# Patient Record
Sex: MALE | Race: WHITE | NOT HISPANIC OR LATINO | URBAN - METROPOLITAN AREA
[De-identification: names, ages, dates, MRNs, and addresses within clinical notes are randomized per-mention and may not be internally consistent; named-entity substitution may affect disease eponyms.]

---

## 2018-08-28 ENCOUNTER — EMERGENCY (EMERGENCY)
Facility: HOSPITAL | Age: 18
LOS: 1 days | Discharge: HOME | End: 2018-08-28
Admitting: EMERGENCY MEDICINE
Payer: COMMERCIAL

## 2018-08-28 VITALS
SYSTOLIC BLOOD PRESSURE: 127 MMHG | OXYGEN SATURATION: 98 % | DIASTOLIC BLOOD PRESSURE: 70 MMHG | RESPIRATION RATE: 18 BRPM | HEART RATE: 61 BPM | TEMPERATURE: 98 F

## 2018-08-28 DIAGNOSIS — S61.412A LACERATION WITHOUT FOREIGN BODY OF LEFT HAND, INITIAL ENCOUNTER: ICD-10-CM
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
## 2018-08-28 PROCEDURE — 99282 EMERGENCY DEPT VISIT SF MDM: CPT | Mod: 25

## 2018-08-28 PROCEDURE — 12001 RPR S/N/AX/GEN/TRNK 2.5CM/<: CPT

## 2018-08-28 NOTE — ED ADULT TRIAGE NOTE - CHIEF COMPLAINT QUOTE
pt ambulatory, complaining of laceration to palm of left hand. bleeding controlled. TDAP two years ago.

## 2018-08-29 NOTE — ED PROVIDER NOTE - NS ED ROS FT
· CONSTITUTIONAL: no fever and no chills.  · CARDIOVASCULAR: normal rate and rhythm, no chest pain and no edema.  · RESPIRATORY: no chest pain, no cough, and no shortness of breath.  · GASTROINTESTINAL: no abdominal pain, no bloating, no constipation, no diarrhea, no nausea and no vomiting.  · MUSCULOSKELETAL: no back pain, no musculoskeletal pain, no neck pain, and no weakness.  · SKIN: +laceration, no abrasions, no jaundice, no lesions, no pruritis, and no rashes.  · NEURO: no loss of consciousness, no gait abnormality, no headache, no sensory deficits, and no weakness.  · PSYCHIATRIC: no known mental health issues.

## 2018-08-29 NOTE — ED ADULT NURSE NOTE - OBJECTIVE STATEMENT
Pt presents to ED c/o x4cm laceration to the left palm s/p attempting to cut a frozen bagel. Tdap up to date. Pt able to flex and extend hand, +bleeding at site, pressure dressing applied. +Bounding pulses and good cap refill, denies any numbness or tingling.

## 2018-08-29 NOTE — ED PROVIDER NOTE - MEDICAL DECISION MAKING DETAILS
left hand laceration. tdap UTD. no tendon involvement. lac repaired. wound care discussed. no indication for abx at this time.

## 2018-08-29 NOTE — ED ADULT NURSE NOTE - NSIMPLEMENTINTERV_GEN_ALL_ED
Implemented All Universal Safety Interventions:  [place] to call system. Call bell, personal items and telephone within reach. Instruct patient to call for assistance. Room bathroom lighting operational. Non-slip footwear when patient is off stretcher. Physically safe environment: no spills, clutter or unnecessary equipment. Stretcher in lowest position, wheels locked, appropriate side rails in place.

## 2018-08-29 NOTE — ED PROCEDURE NOTE - SKIN SUTURE
interrupted no wheezes/no chest wall tenderness/no rhonchi/respirations non-labored/no rales/normal/clear to auscultation bilaterally/breath sounds equal/airway patent/good air movement/no intercostal retractions

## 2018-08-29 NOTE — ED PROVIDER NOTE - OBJECTIVE STATEMENT
17 year old male with no PMhx presents with laceration to left hand palm area. reports was trying to cut a frozen bagel. tdap UTD. no other injury or trauma. normal ROM in hand.  Denies d/c, HA, dizziness, SOB, CP, palpitations, N/V, change in ROM/sensation, abdominal/back/neck pain, focal weakness, and malaise.

## 2018-08-29 NOTE — ED PROVIDER NOTE - PHYSICAL EXAMINATION
VITAL SIGNS: I have reviewed nursing notes and confirm.  CONSTITUTIONAL: Well-developed; well-nourished; in no acute distress.  SKIN: 2c laceration at base of left thumb, Skin is warm and dry, no acute rash.  HEAD: Normocephalic; atraumatic.  EYES: PERRL, EOM intact; conjunctiva and sclera clear.  ENT: No nasal discharge; airway clear.  NECK: Supple; non tender.  CARD: S1, S2 normal; no murmurs, gallops, or rubs. Regular rate and rhythm.  RESP: No wheezes, rales or rhonchi.  ABD: Normal bowel sounds; soft; non-distended; non-tender;  no palpable or pulsating mass; no hepatosplenomegaly.  EXT: LEFT HAND: flexor/extensor tendons intact, ROM normal, no bony tenderness OTHER: Normal ROM. No clubbing, cyanosis or edema.  NEURO: Alert, oriented. Grossly unremarkable.  PSYCH: Cooperative, appropriate.

## 2023-02-10 NOTE — ED PROVIDER NOTE - CARE PLAN
Stable on fluoxetine 40 mg daily.  No refills needed.  Continue to monitor.   Principal Discharge DX:	Laceration of left hand without foreign body, initial encounter